# Patient Record
(demographics unavailable — no encounter records)

---

## 2025-03-31 NOTE — ASSESSMENT
[FreeTextEntry1] : Start: Albuterol Sulfate  (90 Base) MCG/ACT Inhalation Aerosol Solution; INHALE 2 PUFFS EVERY 4-6 HOURS AS NEEDED Start: Breztri Aerosphere 160-9-4.8 MCG/ACT Inhalation Aerosol; INHALE 2 PUFFS Twice daily Start: Amoxicillin-Pot Clavulanate 875-125 MG Oral Tablet; TAKE 1 TABLET TWICE DAILY WITH MEALS x 10 DAYS

## 2025-03-31 NOTE — HISTORY OF PRESENT ILLNESS
[de-identified] : Philly Franco returned for a follow-up appointment regarding her asthma and sinus infection. She reported experiencing a sinus infection accompanied by symptoms such as a headache, sinus pressure, and a cough. Additionally, she noted the presence of clear mucus, with occasional discoloration. Currently on Breztri 160 with no problem.

## 2025-03-31 NOTE — HISTORY OF PRESENT ILLNESS
[de-identified] : Philly Franco returned for a follow-up appointment regarding her asthma and sinus infection. She reported experiencing a sinus infection accompanied by symptoms such as a headache, sinus pressure, and a cough. Additionally, she noted the presence of clear mucus, with occasional discoloration. Currently on Breztri 160 with no problem.